# Patient Record
Sex: FEMALE | Race: WHITE
[De-identification: names, ages, dates, MRNs, and addresses within clinical notes are randomized per-mention and may not be internally consistent; named-entity substitution may affect disease eponyms.]

---

## 2018-01-20 ENCOUNTER — HOSPITAL ENCOUNTER (EMERGENCY)
Dept: HOSPITAL 25 - UCCORT | Age: 28
Discharge: HOME | End: 2018-01-20
Payer: COMMERCIAL

## 2018-01-20 VITALS — SYSTOLIC BLOOD PRESSURE: 109 MMHG | DIASTOLIC BLOOD PRESSURE: 67 MMHG

## 2018-01-20 DIAGNOSIS — T55.0X1A: Primary | ICD-10-CM
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
## 2018-01-20 PROCEDURE — G0463 HOSPITAL OUTPT CLINIC VISIT: HCPCS

## 2018-01-20 PROCEDURE — 99212 OFFICE O/P EST SF 10 MIN: CPT
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
